# Patient Record
Sex: FEMALE | Race: WHITE | NOT HISPANIC OR LATINO | Employment: FULL TIME | URBAN - METROPOLITAN AREA
[De-identification: names, ages, dates, MRNs, and addresses within clinical notes are randomized per-mention and may not be internally consistent; named-entity substitution may affect disease eponyms.]

---

## 2021-08-16 ENCOUNTER — HOSPITAL ENCOUNTER (EMERGENCY)
Facility: HOSPITAL | Age: 31
Discharge: HOME | End: 2021-08-16
Attending: EMERGENCY MEDICINE | Admitting: EMERGENCY MEDICINE
Payer: COMMERCIAL

## 2021-08-16 VITALS
TEMPERATURE: 97.5 F | HEART RATE: 87 BPM | BODY MASS INDEX: 29.02 KG/M2 | OXYGEN SATURATION: 99 % | SYSTOLIC BLOOD PRESSURE: 130 MMHG | DIASTOLIC BLOOD PRESSURE: 98 MMHG | RESPIRATION RATE: 18 BRPM | HEIGHT: 64 IN | WEIGHT: 170 LBS

## 2021-08-16 DIAGNOSIS — M25.562 LEFT KNEE PAIN: Primary | ICD-10-CM

## 2021-08-16 LAB
ANION GAP SERPL CALCULATED.3IONS-SCNC: 4 MMOL/L (ref 4–13)
BUN SERPL-MCNC: 17 MG/DL (ref 5–25)
CALCIUM SERPL-MCNC: 9.6 MG/DL (ref 8.3–10.1)
CHLORIDE SERPL-SCNC: 106 MMOL/L (ref 100–108)
CO2 SERPL-SCNC: 27 MMOL/L (ref 21–32)
CREAT SERPL-MCNC: 0.79 MG/DL (ref 0.6–1.3)
D DIMER PPP FEU-MCNC: <0.27 UG/ML FEU
GFR SERPL CREATININE-BSD FRML MDRD: 101 ML/MIN/1.73SQ M
GLUCOSE SERPL-MCNC: 83 MG/DL (ref 65–140)
POTASSIUM SERPL-SCNC: 4 MMOL/L (ref 3.5–5.3)
SODIUM SERPL-SCNC: 137 MMOL/L (ref 136–145)

## 2021-08-16 PROCEDURE — 99282 EMERGENCY DEPT VISIT SF MDM: CPT | Performed by: EMERGENCY MEDICINE

## 2021-08-16 PROCEDURE — 99283 EMERGENCY DEPT VISIT LOW MDM: CPT

## 2021-08-16 PROCEDURE — 85379 FIBRIN DEGRADATION QUANT: CPT | Performed by: EMERGENCY MEDICINE

## 2021-08-16 PROCEDURE — 80048 BASIC METABOLIC PNL TOTAL CA: CPT | Performed by: EMERGENCY MEDICINE

## 2021-08-16 PROCEDURE — 36415 COLL VENOUS BLD VENIPUNCTURE: CPT | Performed by: EMERGENCY MEDICINE

## 2021-08-16 RX ORDER — IBUPROFEN 400 MG/1
400 TABLET ORAL ONCE
Status: COMPLETED | OUTPATIENT
Start: 2021-08-16 | End: 2021-08-16

## 2021-08-16 RX ADMIN — IBUPROFEN 400 MG: 400 TABLET ORAL at 03:15

## 2021-08-16 NOTE — ED ATTENDING ATTESTATION
8/16/2021  IEleonora MD, saw and evaluated the patient  I have discussed the patient with the resident/non-physician practitioner and agree with the resident's/non-physician practitioner's findings, Plan of Care, and MDM as documented in the resident's/non-physician practitioner's note, except where noted  All available labs and Radiology studies were reviewed  I was present for key portions of any procedure(s) performed by the resident/non-physician practitioner and I was immediately available to provide assistance  At this point I agree with the current assessment done in the Emergency Department  I have conducted an independent evaluation of this patient a history and physical is as follows:    ED Course     Emergency Department Note- Reji Thompson 35 y o  female MRN: [id number]    Unit/Bed#: ED 01 Encounter: [id number]    Reji Thompson is a 35 y o  female who presents with   Chief Complaint   Patient presents with    Knee Pain     Pt c/o left knee pain  Pt states about 2 weeks ago she felt like the veins in the back of her knee hurt  Pt states as the time went on her knee started hurting more and more  Pt states tonight the pain is radiating from the knee down and also started with some left arm pain  History of Present Illness   HPI:  Reji Thompson is a 35 y o  female who presents for evaluation of:  Acute left lateral knee and left calf lateral pain  The patient noted the onset around 2 weeks ago of uncomfortable veins on the outside of her calf bothering her  The patient denies any trauma to her leg  Ambulation does not provoke or palliate the discomfort  The patient has had progressively worse left lateral knee and left calf pain  Tonight she developed some pain of her left upper extremity and was concerned about the combination of left upper extremity and left lower extremity pain  The patient denies history of venous thromboembolism      Review of Systems   Constitutional: Negative for chills and fever  HENT: Negative for congestion and rhinorrhea  Respiratory: Negative for cough and shortness of breath  Gastrointestinal: Positive for nausea  Negative for vomiting  Musculoskeletal: Negative for back pain and neck pain  All other systems reviewed and are negative  Historical Information   History reviewed  No pertinent past medical history  History reviewed  No pertinent surgical history  Social History   Social History     Substance and Sexual Activity   Alcohol Use Yes     Social History     Substance and Sexual Activity   Drug Use Yes    Types: Marijuana     Social History     Tobacco Use   Smoking Status Current Some Day Smoker    Packs/day: 0 25    Types: Cigarettes   Smokeless Tobacco Never Used     Family History: History reviewed  No pertinent family history  Meds/Allergies   PTA meds:   None     Allergies   Allergen Reactions    Amoxicillin Other (See Comments)     Pt states "I turn purple"    Penicillins Other (See Comments)     Pt states "I turn purple"       Sulfa Antibiotics Hives     per pacu order sheet    Sulfisoxazole Hives       Objective   First Vitals:   Blood Pressure: 130/98 (08/16/21 0124)  Pulse: 87 (08/16/21 0124)  Temperature: 97 5 °F (36 4 °C) (08/16/21 0124)  Temp Source: Oral (08/16/21 0124)  Respirations: 18 (08/16/21 0124)  Height: 5' 4" (162 6 cm) (08/16/21 0124)  Weight - Scale: 77 1 kg (170 lb) (08/16/21 0124)  SpO2: 99 % (08/16/21 0124)    Current Vitals:   Blood Pressure: 130/98 (08/16/21 0124)  Pulse: 87 (08/16/21 0124)  Temperature: 97 5 °F (36 4 °C) (08/16/21 0124)  Temp Source: Oral (08/16/21 0124)  Respirations: 18 (08/16/21 0124)  Height: 5' 4" (162 6 cm) (08/16/21 0124)  Weight - Scale: 77 1 kg (170 lb) (08/16/21 0124)  SpO2: 99 % (08/16/21 0124)    No intake or output data in the 24 hours ending 08/16/21 0334    Invasive Devices     Peripheral Intravenous Line            Peripheral IV 08/16/21 Right Antecubital <1 day                Physical Exam  Vitals and nursing note reviewed  Constitutional:       Appearance: Normal appearance  HENT:      Head: Normocephalic and atraumatic  Right Ear: External ear normal       Left Ear: External ear normal       Nose: Nose normal       Mouth/Throat:      Mouth: Mucous membranes are moist       Pharynx: Oropharynx is clear  Eyes:      Extraocular Movements: Extraocular movements intact  Conjunctiva/sclera: Conjunctivae normal    Cardiovascular:      Rate and Rhythm: Normal rate and regular rhythm  Pulmonary:      Effort: Pulmonary effort is normal  No respiratory distress  Musculoskeletal:         General: No tenderness  Normal range of motion  Skin:     General: Skin is warm and dry  Capillary Refill: Capillary refill takes less than 2 seconds  Neurological:      General: No focal deficit present  Mental Status: She is alert and oriented to person, place, and time  Mental status is at baseline  Psychiatric:         Mood and Affect: Mood normal          Behavior: Behavior normal          Thought Content: Thought content normal          Judgment: Judgment normal            Medical Decision Makin  Acute lateral left lower extremity discomfort:  D-dimer, if negative will discharge    Recent Results (from the past 36 hour(s))   D-dimer, quantitative    Collection Time: 21  2:56 AM   Result Value Ref Range    D-Dimer, Quant <0 27 <0 50 ug/ml FEU   Basic metabolic panel    Collection Time: 21  2:56 AM   Result Value Ref Range    Sodium 137 136 - 145 mmol/L    Potassium 4 0 3 5 - 5 3 mmol/L    Chloride 106 100 - 108 mmol/L    CO2 27 21 - 32 mmol/L    ANION GAP 4 4 - 13 mmol/L    BUN 17 5 - 25 mg/dL    Creatinine 0 79 0 60 - 1 30 mg/dL    Glucose 83 65 - 140 mg/dL    Calcium 9 6 8 3 - 10 1 mg/dL    eGFR 101 ml/min/1 73sq m     No orders to display         Portions of the record may have been created with voice recognition software   Occasional wrong word or "sound a like" substitutions may have occurred due to the inherent limitations of voice recognition software  Read the chart carefully and recognize, using context, where substitutions have occurred          Critical Care Time  Procedures

## 2021-08-16 NOTE — ED PROVIDER NOTES
History  Chief Complaint   Patient presents with    Knee Pain     Pt c/o left knee pain  Pt states about 2 weeks ago she felt like the veins in the back of her knee hurt  Pt states as the time went on her knee started hurting more and more  Pt states tonight the pain is radiating from the knee down and also started with some left arm pain  Patient is a 35year old female who presents for evaluation of left knee pain  Patient states that pain started approximately 2 weeks ago  Patient states that she feels as though there is something wrong with the veins in the back of her knee  Patient states that pain has been worsening over the past 2 weeks, patient now endorsing left calf pain and numbness/tingling in the left foot which started this morning  Patient denies any known family or personal history of clotting disorders  Denies any hormonal birth control  Patient states that she did recently travel to [place], however, pain started prior to her trip  Patient denies any injury to the area, but does state that she works as a  and is on her feet quite a bit  Patient currently denies any fevers, chills, chest pain, shortness of breath, abdominal pain, nausea vomiting  History provided by:  Patient   used: No    Knee Pain  Location:  Knee  Knee location:  L knee  Pain details:     Duration:  2 weeks    Progression:  Worsening  Worsened by: Activity  Associated symptoms: swelling and tingling    Associated symptoms: no fever        None       History reviewed  No pertinent past medical history  History reviewed  No pertinent surgical history  History reviewed  No pertinent family history  I have reviewed and agree with the history as documented      E-Cigarette/Vaping     E-Cigarette/Vaping Substances     Social History     Tobacco Use    Smoking status: Current Some Day Smoker     Packs/day: 0 25     Types: Cigarettes    Smokeless tobacco: Never Used   Substance Use Topics    Alcohol use: Yes    Drug use: Yes     Types: Marijuana        Review of Systems   Constitutional: Negative for chills and fever  HENT: Negative  Respiratory: Negative  Cardiovascular: Negative  Gastrointestinal: Negative  Genitourinary: Negative  Musculoskeletal: Positive for arthralgias  Skin: Negative  Neurological: Positive for numbness  All other systems reviewed and are negative  Physical Exam  ED Triage Vitals [08/16/21 0124]   Temperature Pulse Respirations Blood Pressure SpO2   97 5 °F (36 4 °C) 87 18 130/98 99 %      Temp Source Heart Rate Source Patient Position - Orthostatic VS BP Location FiO2 (%)   Oral Monitor Sitting Right arm --      Pain Score       Worst Possible Pain             Orthostatic Vital Signs  Vitals:    08/16/21 0124   BP: 130/98   Pulse: 87   Patient Position - Orthostatic VS: Sitting       Physical Exam  Vitals and nursing note reviewed  Constitutional:       General: She is not in acute distress  Appearance: She is well-developed  HENT:      Head: Normocephalic and atraumatic  Eyes:      Conjunctiva/sclera: Conjunctivae normal    Cardiovascular:      Rate and Rhythm: Normal rate and regular rhythm  Pulmonary:      Effort: Pulmonary effort is normal  No respiratory distress  Musculoskeletal:      Cervical back: Neck supple  Left knee: No effusion, bony tenderness or crepitus  Comments: Tenderness to palpation behind left knee and posterior left calf  No swelling noted  Tenderness to palpation over lateral knee/distal IT band  Skin:     General: Skin is warm and dry  Neurological:      General: No focal deficit present  Mental Status: She is alert and oriented to person, place, and time           ED Medications  Medications   ibuprofen (MOTRIN) tablet 400 mg (400 mg Oral Given 8/16/21 0315)       Diagnostic Studies  Results Reviewed     Procedure Component Value Units Date/Time    D-dimer, quantitative [[id number]] (Normal) Collected: 08/16/21 0256    Lab Status: Final result Specimen: Blood from Arm, Right Updated: 08/16/21 0325     D-Dimer, Quant <0 27 ug/ml [facility]     Basic metabolic panel [[id number]] Collected: 08/16/21 0256    Lab Status: Final result Specimen: Blood from Arm, Right Updated: 08/16/21 0319     Sodium 137 mmol/L      Potassium 4 0 mmol/L      Chloride 106 mmol/L      CO2 27 mmol/L      ANION GAP 4 mmol/L      BUN 17 mg/dL      Creatinine 0 79 mg/dL      Glucose 83 mg/dL      Calcium 9 6 mg/dL      eGFR 101 ml/min/1 73sq m     Narrative:      [place] guidelines for Chronic Kidney Disease (CKD):     Stage 1 with normal or high GFR (GFR > 90 mL/min/1 73 square meters)    Stage 2 Mild CKD (GFR = 60-89 mL/min/1 73 square meters)    Stage 3A Moderate CKD (GFR = 45-59 mL/min/1 73 square meters)    Stage 3B Moderate CKD (GFR = 30-44 mL/min/1 73 square meters)    Stage 4 Severe CKD (GFR = 15-29 mL/min/1 73 square meters)    Stage 5 End Stage CKD (GFR <15 mL/min/1 73 square meters)  Note: GFR calculation is accurate only with a steady state creatinine                 No orders to display         Procedures  Procedures      ED Course  ED Course as of Aug 16 0340   Mon Aug 16, 2021   2783 D-Dimer, Quant: <0 27                                   Romana Pih' Criteria for DVT      Most Recent Value   Wells' Criteria for DVT   Active cancer Treatment or palliation within 6 months  0 Filed at: 08/16/2021 2544   Bedridden recently >3 days or major surgery within 12 weeks  0 Filed at: 08/16/2021 0310   Calf swelling >3 cm compared to the other leg  0 Filed at: 08/16/2021 0310   Entire leg swollen  0 Filed at: 08/16/2021 0310   Collateral (nonvaricose) superficial veins present  0 Filed at: 08/16/2021 0310   Localized tenderness along the deep venous system  0 Filed at: 08/16/2021 0310   Pitting edema, confined to symptomatic leg  0 Filed at: 08/16/2021 0310   Paralysis, paresis, or recent plaster immobilization of the lower extremity  0 Filed at: 08/16/2021 0310   Previously documented DVT  0 Filed at: 08/16/2021 7289   Alternative diagnosis to DVT as likely or more likely  0 Filed at: 08/16/2021 0310   Sinan DVT Critera Score  0 Filed at: 08/16/2021 0310          [facility]  Number of Diagnoses or Management Options  Left knee pain: new and requires workup  Diagnosis management comments: 35year old female presents for evaluation of left knee pain of 2 weeks duration  Pain located in the popliteal region and lateral knee/distal IT band  No knee swelling noted  Patient does have tenderness to palpation of posterior knee and left calf  Well's score of 0 and negative d-dimer effectively rules out DVT  Patient given motrin for pain relief  Likely IT band hypertonicity/MSK pain  Patient told to use compression, ice, and ibuprofen  Patient given instructions for stretching  Told to follow up with PCP and given orthopedic contact information  Patient discharged in stable condition with strict ED return precautions  Amount and/or Complexity of Data Reviewed  Clinical lab tests: ordered and reviewed    Patient Progress  Patient progress: stable      Disposition  Final diagnoses:   Left knee pain     Time reflects when diagnosis was documented in both MDM as applicable and the Disposition within this note     Time User Action Codes Description Comment    8/16/2021  3:27 AM Anita Magallon Add [M25 562] Left knee pain       ED Disposition     ED Disposition Condition Date/Time Comment    Discharge Stable Mon Aug 16, 2021  3:27 AM Shorty Button discharge to home/self care              Follow-up Information     Follow up With Specialties Details Why Contact Info Additional [address] [facility] Orthopedic Surgery Schedule an appointment as soon as possible for a visit in 1 week As needed [address] [phone] [facility] [address], [address], [place], [address]  [facility]    [address] [facility] [address] [facility] Emergency Department Emergency Medicine Go to  If symptoms worsen [address]  [address] [facility] Emergency [facility], [address], [place], [address]   [phone]          Patient's Medications    No medications on file     No discharge procedures on file  PDMP Review     None           ED Provider  Attending physically available and evaluated Kal Mason I managed the patient along with the ED Attending      Electronically Signed by         Ralph Juarez DO  08/16/21 9317